# Patient Record
Sex: FEMALE | Race: WHITE | NOT HISPANIC OR LATINO | Employment: OTHER | URBAN - METROPOLITAN AREA
[De-identification: names, ages, dates, MRNs, and addresses within clinical notes are randomized per-mention and may not be internally consistent; named-entity substitution may affect disease eponyms.]

---

## 2023-09-21 PROBLEM — Z96.1 PSEUDOPHAKIA OF RIGHT EYE: Status: ACTIVE | Noted: 2023-09-21

## 2023-09-21 PROBLEM — H40.1133 PRIMARY OPEN-ANGLE GLAUCOMA, BILATERAL, SEVERE STAGE: Status: ACTIVE | Noted: 2023-09-21

## 2023-09-21 RX ORDER — LISINOPRIL 5 MG/1
TABLET ORAL
COMMUNITY

## 2023-09-21 RX ORDER — LATANOPROST 50 UG/ML
1 SOLUTION/ DROPS OPHTHALMIC NIGHTLY
COMMUNITY
End: 2023-10-20 | Stop reason: SDUPTHER

## 2023-09-21 RX ORDER — ASPIRIN 81 MG/1
TABLET ORAL
COMMUNITY
End: 2023-10-20 | Stop reason: WASHOUT

## 2023-09-21 RX ORDER — DORZOLAMIDE HYDROCHLORIDE AND TIMOLOL MALEATE 20; 5 MG/ML; MG/ML
1 SOLUTION/ DROPS OPHTHALMIC 2 TIMES DAILY
COMMUNITY
Start: 2022-11-11 | End: 2023-10-20 | Stop reason: SDUPTHER

## 2023-09-21 RX ORDER — IBUPROFEN 200 MG
TABLET ORAL
COMMUNITY

## 2023-09-21 RX ORDER — ATENOLOL 50 MG/1
TABLET ORAL
COMMUNITY

## 2023-09-21 RX ORDER — LEVOTHYROXINE SODIUM 25 UG/1
TABLET ORAL
COMMUNITY

## 2023-10-20 ENCOUNTER — OFFICE VISIT (OUTPATIENT)
Dept: OPHTHALMOLOGY | Facility: CLINIC | Age: 73
End: 2023-10-20
Payer: MEDICARE

## 2023-10-20 DIAGNOSIS — Z96.1 PSEUDOPHAKIA OF BOTH EYES: ICD-10-CM

## 2023-10-20 DIAGNOSIS — H40.1133 PRIMARY OPEN-ANGLE GLAUCOMA, BILATERAL, SEVERE STAGE: Primary | ICD-10-CM

## 2023-10-20 PROCEDURE — 99213 OFFICE O/P EST LOW 20 MIN: CPT | Performed by: OPHTHALMOLOGY

## 2023-10-20 RX ORDER — ACETAMINOPHEN 500 MG
4000 TABLET ORAL
COMMUNITY
Start: 2021-06-24

## 2023-10-20 RX ORDER — LANOLIN ALCOHOL/MO/W.PET/CERES
1000 CREAM (GRAM) TOPICAL
COMMUNITY
Start: 2020-04-06

## 2023-10-20 RX ORDER — LATANOPROST 50 UG/ML
1 SOLUTION/ DROPS OPHTHALMIC NIGHTLY
Qty: 1.5 ML | Refills: 11 | Status: SHIPPED | OUTPATIENT
Start: 2023-10-20 | End: 2024-10-19

## 2023-10-20 RX ORDER — DORZOLAMIDE HYDROCHLORIDE AND TIMOLOL MALEATE 20; 5 MG/ML; MG/ML
1 SOLUTION/ DROPS OPHTHALMIC 2 TIMES DAILY
Qty: 10 ML | Refills: 11 | Status: SHIPPED | OUTPATIENT
Start: 2023-10-20 | End: 2024-10-19

## 2023-10-20 ASSESSMENT — CONF VISUAL FIELD
OD_NORMAL: 1
OD_SUPERIOR_TEMPORAL_RESTRICTION: 0
OS_INFERIOR_TEMPORAL_RESTRICTION: 0
OD_INFERIOR_TEMPORAL_RESTRICTION: 0
OD_SUPERIOR_NASAL_RESTRICTION: 0
OS_SUPERIOR_NASAL_RESTRICTION: 0
OS_NORMAL: 1
OD_INFERIOR_NASAL_RESTRICTION: 0
OS_INFERIOR_NASAL_RESTRICTION: 0
OS_SUPERIOR_TEMPORAL_RESTRICTION: 0

## 2023-10-20 ASSESSMENT — ENCOUNTER SYMPTOMS
PSYCHIATRIC NEGATIVE: 0
GASTROINTESTINAL NEGATIVE: 0
EYES NEGATIVE: 1
RESPIRATORY NEGATIVE: 0
ALLERGIC/IMMUNOLOGIC NEGATIVE: 0
CARDIOVASCULAR NEGATIVE: 0
HEMATOLOGIC/LYMPHATIC NEGATIVE: 0
ENDOCRINE NEGATIVE: 0
MUSCULOSKELETAL NEGATIVE: 0
CONSTITUTIONAL NEGATIVE: 0
NEUROLOGICAL NEGATIVE: 0

## 2023-10-20 ASSESSMENT — PACHYMETRY
OD_CT(UM): 556
OS_CT(UM): 556

## 2023-10-20 ASSESSMENT — REFRACTION_WEARINGRX
OD_ADD: +2.75
OS_SPHERE: +0.75
OD_SPHERE: +0.25
OS_CYLINDER: -0.50
OS_ADD: +2.75
OD_CYLINDER: DS
OS_AXIS: 117

## 2023-10-20 ASSESSMENT — VISUAL ACUITY
OS_CC: 20/30
CORRECTION_TYPE: GLASSES
METHOD: SNELLEN - LINEAR
OD_CC+: -1
OD_CC: 20/20

## 2023-10-20 ASSESSMENT — CUP TO DISC RATIO
OS_RATIO: 0.9
OD_RATIO: 0.85

## 2023-10-20 ASSESSMENT — TONOMETRY
OS_IOP_MMHG: 14
IOP_METHOD: GOLDMANN APPLANATION
OD_IOP_MMHG: 13

## 2023-10-20 ASSESSMENT — SLIT LAMP EXAM - LIDS
COMMENTS: NORMAL
COMMENTS: NORMAL

## 2023-10-20 ASSESSMENT — EXTERNAL EXAM - RIGHT EYE: OD_EXAM: NORMAL

## 2023-10-20 ASSESSMENT — EXTERNAL EXAM - LEFT EYE: OS_EXAM: NORMAL

## 2023-10-20 NOTE — PROGRESS NOTES
Visual Acuity (Snellen - Linear)         Right Left    Dist cc 20/20 -1 20/30      Correction: Glasses          Tonometry       Tonometry (Goldmann Applanation, 9:35 AM)         Right Left    Pressure 13 14                  Assessment/Plan   Last dilated:  7/14/23    1.  Primary Open-Angle Glaucoma OU:  /555 Tm unknown.  s/p combined with trabectome OD (2/7/18): pre-op VA 20/50, IOP 20 mmHg. s/p combined with trabectome OS (1/2/18):  pre-op VA 20/60, IOP 16 mmHg. IOPs are back within the therapeutic range.    Plan:  cont latanoprost OU QHS           cont dorzolamide/timolol OU BID           f/u 4 months                  2.  Pseudophakia (PCIOL) OU: mild PCO OD s/p YAG Cap OS.  With MRx change VA is 20/20 OU     Plan:  montior    3.  PVD OU:  asymptomatic     Plan:  monitor     4.  H/o high myopia (prior to cataract surgery, pt was ~10D myope OD and ~9D myope OS):       Plan: will monitor for myopic retinal degeneration     5.  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  No change from 2020 photo or clinical exam     Plan:  cont monitoring

## 2024-02-23 ENCOUNTER — APPOINTMENT (OUTPATIENT)
Dept: OPHTHALMOLOGY | Facility: CLINIC | Age: 74
End: 2024-02-23
Payer: MEDICARE

## 2024-05-24 ENCOUNTER — OFFICE VISIT (OUTPATIENT)
Dept: OPHTHALMOLOGY | Facility: CLINIC | Age: 74
End: 2024-05-24
Payer: MEDICARE

## 2024-05-24 DIAGNOSIS — H40.1133 PRIMARY OPEN-ANGLE GLAUCOMA, BILATERAL, SEVERE STAGE: Primary | ICD-10-CM

## 2024-05-24 DIAGNOSIS — Z96.1 PSEUDOPHAKIA OF BOTH EYES: ICD-10-CM

## 2024-05-24 PROCEDURE — 99213 OFFICE O/P EST LOW 20 MIN: CPT | Performed by: OPHTHALMOLOGY

## 2024-05-24 ASSESSMENT — ENCOUNTER SYMPTOMS
PSYCHIATRIC NEGATIVE: 0
RESPIRATORY NEGATIVE: 0
NEUROLOGICAL NEGATIVE: 0
ALLERGIC/IMMUNOLOGIC NEGATIVE: 0
ENDOCRINE NEGATIVE: 0
GASTROINTESTINAL NEGATIVE: 0
CONSTITUTIONAL NEGATIVE: 0
EYES NEGATIVE: 1
CARDIOVASCULAR NEGATIVE: 0
MUSCULOSKELETAL NEGATIVE: 0
HEMATOLOGIC/LYMPHATIC NEGATIVE: 0

## 2024-05-24 ASSESSMENT — EXTERNAL EXAM - RIGHT EYE: OD_EXAM: NORMAL

## 2024-05-24 ASSESSMENT — CONF VISUAL FIELD
OD_INFERIOR_NASAL_RESTRICTION: 0
OD_NORMAL: 1
OS_INFERIOR_NASAL_RESTRICTION: 0
OD_SUPERIOR_NASAL_RESTRICTION: 0
OD_INFERIOR_TEMPORAL_RESTRICTION: 0
OS_NORMAL: 1
OS_INFERIOR_TEMPORAL_RESTRICTION: 0
OS_SUPERIOR_TEMPORAL_RESTRICTION: 0
METHOD: COUNTING FINGERS
OD_SUPERIOR_TEMPORAL_RESTRICTION: 0
OS_SUPERIOR_NASAL_RESTRICTION: 0

## 2024-05-24 ASSESSMENT — REFRACTION_WEARINGRX
OD_ADD: +2.75
OS_ADD: +2.75
OD_SPHERE: +0.25
OS_CYLINDER: -0.50
OS_SPHERE: +0.75
OD_CYLINDER: DS
OS_AXIS: 117

## 2024-05-24 ASSESSMENT — VISUAL ACUITY
OS_CC: 20/30
OS_CC+: -2
OD_CC: 20/20
METHOD: SNELLEN - LINEAR
CORRECTION_TYPE: GLASSES

## 2024-05-24 ASSESSMENT — PACHYMETRY
OD_CT(UM): 556
OS_CT(UM): 556

## 2024-05-24 ASSESSMENT — CUP TO DISC RATIO
OD_RATIO: 0.85
OS_RATIO: 0.9

## 2024-05-24 ASSESSMENT — SLIT LAMP EXAM - LIDS
COMMENTS: NORMAL
COMMENTS: NORMAL

## 2024-05-24 ASSESSMENT — TONOMETRY
IOP_METHOD: GOLDMANN APPLANATION
OS_IOP_MMHG: 16
OD_IOP_MMHG: 16

## 2024-05-24 ASSESSMENT — EXTERNAL EXAM - LEFT EYE: OS_EXAM: NORMAL

## 2024-05-24 NOTE — PROGRESS NOTES
Visual Acuity (Snellen - Linear)         Right Left    Dist cc 20/20 20/30 -2    Dist ph cc  NI      Correction: Glasses          Tonometry       Tonometry (Goldmann Applanation, 10:58 AM)         Right Left    Pressure 16 16                  Assessment/Plan   Last dilated:  7/14/23    1.  Primary Open-Angle Glaucoma OU:  /555 Tm unknown.  s/p combined with trabectome OD (2/7/18): pre-op VA 20/50, IOP 20 mmHg. s/p combined with trabectome OS (1/2/18):  pre-op VA 20/60, IOP 16 mmHg. IOPs are back within the therapeutic range.    Plan:  cont latanoprost OU QHS           cont dorzolamide/timolol OU BID           f/u 2 months (HVF, dilation, RNFL)                  2.  Pseudophakia (PCIOL) OU: mild PCO OD s/p YAG Cap OS.  With MRx change VA is 20/20 OU     Plan:  montior    3.  PVD OU:  asymptomatic     Plan:  monitor     4.  H/o high myopia (prior to cataract surgery, pt was ~10D myope OD and ~9D myope OS):       Plan: will monitor for myopic retinal degeneration     5.  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  No change from 2020 photo or clinical exam     Plan:  cont monitoring

## 2024-08-09 ENCOUNTER — APPOINTMENT (OUTPATIENT)
Dept: OPHTHALMOLOGY | Facility: CLINIC | Age: 74
End: 2024-08-09
Payer: MEDICARE

## 2024-08-30 ENCOUNTER — APPOINTMENT (OUTPATIENT)
Dept: OPHTHALMOLOGY | Facility: CLINIC | Age: 74
End: 2024-08-30
Payer: MEDICARE

## 2024-08-30 DIAGNOSIS — Z96.1 PSEUDOPHAKIA OF BOTH EYES: ICD-10-CM

## 2024-08-30 DIAGNOSIS — H40.1133 PRIMARY OPEN-ANGLE GLAUCOMA, BILATERAL, SEVERE STAGE: Primary | ICD-10-CM

## 2024-08-30 DIAGNOSIS — D31.31 CHOROIDAL NEVUS, RIGHT EYE: ICD-10-CM

## 2024-08-30 PROCEDURE — 92083 EXTENDED VISUAL FIELD XM: CPT | Performed by: OPHTHALMOLOGY

## 2024-08-30 PROCEDURE — 99214 OFFICE O/P EST MOD 30 MIN: CPT | Performed by: OPHTHALMOLOGY

## 2024-08-30 PROCEDURE — 92133 CPTRZD OPH DX IMG PST SGM ON: CPT | Performed by: OPHTHALMOLOGY

## 2024-08-30 ASSESSMENT — PACHYMETRY
OD_CT(UM): 556
OS_CT(UM): 556

## 2024-08-30 ASSESSMENT — REFRACTION_WEARINGRX
OS_SPHERE: +0.75
OD_SPHERE: +0.25
OS_ADD: +2.75
OD_CYLINDER: DS
OD_ADD: +2.75
OS_AXIS: 117
OS_CYLINDER: -0.50

## 2024-08-30 ASSESSMENT — SLIT LAMP EXAM - LIDS
COMMENTS: NORMAL
COMMENTS: NORMAL

## 2024-08-30 ASSESSMENT — VISUAL ACUITY
OS_CC+: +2
OD_CC+: -1
OS_CC: 20/25
OD_CC: 20/20
METHOD: SNELLEN - LINEAR

## 2024-08-30 ASSESSMENT — TONOMETRY
IOP_METHOD: GOLDMANN APPLANATION
OS_IOP_MMHG: 16
OD_IOP_MMHG: 16

## 2024-08-30 ASSESSMENT — CUP TO DISC RATIO
OS_RATIO: 0.9
OD_RATIO: 0.85

## 2024-08-30 ASSESSMENT — EXTERNAL EXAM - RIGHT EYE: OD_EXAM: NORMAL

## 2024-08-30 ASSESSMENT — EXTERNAL EXAM - LEFT EYE: OS_EXAM: NORMAL

## 2024-08-30 NOTE — PROGRESS NOTES
Visual Acuity (Snellen - Linear)         Right Left    Dist cc 20/20 -1 20/25 +2          Tonometry       Tonometry (Goldmann Applanation, 8:20 AM)         Right Left    Pressure 16 16                      Assessment/Plan   Last dilated:  8/30/24    1.  Primary Open-Angle Glaucoma OU:  /555 Tm unknown.  s/p combined with trabectome OD (2/7/18): pre-op VA 20/50, IOP 20 mmHg. s/p combined with trabectome OS (1/2/18):  pre-op VA 20/60, IOP 16 mmHg. IOPs are back within the therapeutic range.    Plan:  cont latanoprost OU QHS           cont dorzolamide/timolol OU BID           f/u 4 months                   2.  Pseudophakia (PCIOL) OU: mild PCO OD s/p YAG Cap OS.  With MRx change VA is 20/20 OU     Plan:  montior    3.  PVD OU:  asymptomatic     Plan:  monitor     4.  H/o high myopia (prior to cataract surgery, pt was ~10D myope OD and ~9D myope OS):       Plan: will monitor for myopic retinal degeneration     5.  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  No change from 2020 photo or clinical exam     Plan:  consult Dr. eMndoza 1-2 months for monitoring

## 2024-09-15 DIAGNOSIS — H40.1133 PRIMARY OPEN-ANGLE GLAUCOMA, BILATERAL, SEVERE STAGE: ICD-10-CM

## 2024-09-15 RX ORDER — LATANOPROST 50 UG/ML
1 SOLUTION/ DROPS OPHTHALMIC NIGHTLY
Qty: 7.5 ML | Refills: 3 | Status: SHIPPED | OUTPATIENT
Start: 2024-09-15 | End: 2025-09-15

## 2024-09-15 RX ORDER — DORZOLAMIDE HYDROCHLORIDE AND TIMOLOL MALEATE 20; 5 MG/ML; MG/ML
1 SOLUTION/ DROPS OPHTHALMIC 2 TIMES DAILY
Qty: 10 ML | Refills: 4 | Status: SHIPPED | OUTPATIENT
Start: 2024-09-15 | End: 2025-09-15

## 2024-09-16 NOTE — TELEPHONE ENCOUNTER
Pt called refill line requesting refill on Latanoprost and Cosopt.     LOV w/ Dr. Miller on 08/30/2024

## 2024-10-11 ENCOUNTER — APPOINTMENT (OUTPATIENT)
Dept: OPHTHALMOLOGY | Facility: CLINIC | Age: 74
End: 2024-10-11
Payer: MEDICARE

## 2024-10-11 DIAGNOSIS — D31.31 CHOROIDAL NEVUS, RIGHT EYE: Primary | ICD-10-CM

## 2024-10-11 ASSESSMENT — VISUAL ACUITY
OD_CC: 20/20
OS_CC: 20/25
METHOD: SNELLEN - LINEAR

## 2024-10-11 ASSESSMENT — EXTERNAL EXAM - LEFT EYE: OS_EXAM: NORMAL

## 2024-10-11 ASSESSMENT — CUP TO DISC RATIO
OD_RATIO: 0.85
OS_RATIO: 0.9

## 2024-10-11 ASSESSMENT — PACHYMETRY
OD_CT(UM): 556
OS_CT(UM): 556

## 2024-10-11 ASSESSMENT — ENCOUNTER SYMPTOMS
MUSCULOSKELETAL NEGATIVE: 0
GASTROINTESTINAL NEGATIVE: 0
EYES NEGATIVE: 1
HEMATOLOGIC/LYMPHATIC NEGATIVE: 0
PSYCHIATRIC NEGATIVE: 0
RESPIRATORY NEGATIVE: 0
CARDIOVASCULAR NEGATIVE: 0
NEUROLOGICAL NEGATIVE: 0
CONSTITUTIONAL NEGATIVE: 0
ENDOCRINE NEGATIVE: 0
ALLERGIC/IMMUNOLOGIC NEGATIVE: 0

## 2024-10-11 ASSESSMENT — SLIT LAMP EXAM - LIDS
COMMENTS: NORMAL
COMMENTS: NORMAL

## 2024-10-11 ASSESSMENT — TONOMETRY
OD_IOP_MMHG: 16
IOP_METHOD: GOLDMANN APPLANATION
OS_IOP_MMHG: 17

## 2024-10-11 ASSESSMENT — EXTERNAL EXAM - RIGHT EYE: OD_EXAM: NORMAL

## 2024-10-11 NOTE — PROGRESS NOTES
Choroidal nevus of right eyeD31.31  - Diagnosed in 2020  - DFE/color photo: Flat pigmented choroidal lesion with halo in the inferior to IT ARC                   (-) SRF                   (-) Orange pigment                   (-) drusen    OCT 10/11/24    1. Right eye (OD): Myopic contour, normal RPE, intact outer and inner retinal layers. No IRF or SRF, focal area of RPE irregularities SN to fovea   2. Left eye (OS): Myopic contour, ERM, normal RPE, intact outer and inner retinal layers. No IRF or SRF    -OCT scan through the lesion: Choroidal lesion with compression of the CC with no overlying retinal abnormality    #Plan#:  - Observe  - RTC in 12 m for DFE, Color photos,     PVD OU:  asymptomatic     Plan:  monitor     H/o high myopia (prior to cataract surgery, pt was ~10D myope OD and ~9D myope OS):       Plan: will monitor for myopic retinal degeneration     Primary Open-Angle Glaucoma OU:    Plan:  as per Dr. Miller  cont latanoprost OU QHS  cont dorzolamide/timolol OU BID

## 2024-10-11 NOTE — PROGRESS NOTES
Visual Acuity (Snellen - Linear)         Right Left    Dist cc 20/20 20/25          Tonometry       Tonometry (Goldmann Applanation, 9:28 AM)         Right Left    Pressure 16 17                      Assessment/Plan   Last dilated:  8/30/24    1.  Primary Open-Angle Glaucoma OU:  /555 Tm unknown.  s/p combined with trabectome OD (2/7/18): pre-op VA 20/50, IOP 20 mmHg. s/p combined with trabectome OS (1/2/18):  pre-op VA 20/60, IOP 16 mmHg. IOPs are back within the therapeutic range.    Plan:  cont latanoprost OU QHS           cont dorzolamide/timolol OU BID           f/u 4 months                   2.  Pseudophakia (PCIOL) OU: mild PCO OD s/p YAG Cap OS.  With MRx change VA is 20/20 OU     Plan:  montior    3.  PVD OU:  asymptomatic     Plan:  monitor     4.  H/o high myopia (prior to cataract surgery, pt was ~10D myope OD and ~9D myope OS):       Plan: will monitor for myopic retinal degeneration     5.  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  No change from 2020 photo or clinical exam     Plan:  consult Dr. Mendoza 1-2 months for monitoring

## 2024-12-20 ENCOUNTER — APPOINTMENT (OUTPATIENT)
Dept: OPHTHALMOLOGY | Facility: CLINIC | Age: 74
End: 2024-12-20
Payer: MEDICARE

## 2025-01-31 ENCOUNTER — APPOINTMENT (OUTPATIENT)
Dept: OPHTHALMOLOGY | Facility: CLINIC | Age: 75
End: 2025-01-31
Payer: MEDICARE

## 2025-05-02 ENCOUNTER — APPOINTMENT (OUTPATIENT)
Dept: OPHTHALMOLOGY | Facility: CLINIC | Age: 75
End: 2025-05-02
Payer: COMMERCIAL

## 2025-05-02 DIAGNOSIS — Z96.1 PSEUDOPHAKIA OF BOTH EYES: ICD-10-CM

## 2025-05-02 DIAGNOSIS — H40.1133 PRIMARY OPEN-ANGLE GLAUCOMA, BILATERAL, SEVERE STAGE: ICD-10-CM

## 2025-05-02 DIAGNOSIS — D31.31 CHOROIDAL NEVUS, RIGHT EYE: Primary | ICD-10-CM

## 2025-05-02 PROCEDURE — 99213 OFFICE O/P EST LOW 20 MIN: CPT | Performed by: OPHTHALMOLOGY

## 2025-05-02 RX ORDER — LATANOPROST 50 UG/ML
1 SOLUTION/ DROPS OPHTHALMIC NIGHTLY
Qty: 7.5 ML | Refills: 3 | Status: SHIPPED | OUTPATIENT
Start: 2025-05-02 | End: 2026-05-02

## 2025-05-02 RX ORDER — DORZOLAMIDE HYDROCHLORIDE AND TIMOLOL MALEATE 20; 5 MG/ML; MG/ML
1 SOLUTION/ DROPS OPHTHALMIC 2 TIMES DAILY
Qty: 30 ML | Refills: 3 | Status: SHIPPED | OUTPATIENT
Start: 2025-05-02 | End: 2026-05-02

## 2025-05-02 ASSESSMENT — TONOMETRY
IOP_METHOD: GOLDMANN APPLANATION
OD_IOP_MMHG: 16
OS_IOP_MMHG: 17

## 2025-05-02 ASSESSMENT — REFRACTION_WEARINGRX
OS_CYLINDER: -0.50
OS_AXIS: 117
OD_SPHERE: +0.25
OD_CYLINDER: DS
OD_ADD: +2.75
OS_SPHERE: +0.75
OS_ADD: +2.75

## 2025-05-02 ASSESSMENT — ENCOUNTER SYMPTOMS
ALLERGIC/IMMUNOLOGIC NEGATIVE: 0
HEMATOLOGIC/LYMPHATIC NEGATIVE: 0
RESPIRATORY NEGATIVE: 0
PSYCHIATRIC NEGATIVE: 0
NEUROLOGICAL NEGATIVE: 0
EYES NEGATIVE: 1
ENDOCRINE NEGATIVE: 0
MUSCULOSKELETAL NEGATIVE: 0
CARDIOVASCULAR NEGATIVE: 0
GASTROINTESTINAL NEGATIVE: 0
CONSTITUTIONAL NEGATIVE: 0

## 2025-05-02 ASSESSMENT — VISUAL ACUITY
OS_CC: 20/40
CORRECTION_TYPE: GLASSES
METHOD: SNELLEN - LINEAR
OD_CC: 20/30-2

## 2025-05-02 ASSESSMENT — CONF VISUAL FIELD
METHOD: COUNTING FINGERS
OS_SUPERIOR_TEMPORAL_RESTRICTION: 0
OD_SUPERIOR_TEMPORAL_RESTRICTION: 0
OD_INFERIOR_NASAL_RESTRICTION: 0
OD_NORMAL: 1
OS_INFERIOR_TEMPORAL_RESTRICTION: 0
OD_INFERIOR_TEMPORAL_RESTRICTION: 0
OD_SUPERIOR_NASAL_RESTRICTION: 0
OS_NORMAL: 1
OS_INFERIOR_NASAL_RESTRICTION: 0
OS_SUPERIOR_NASAL_RESTRICTION: 0

## 2025-05-02 ASSESSMENT — PACHYMETRY
OD_CT(UM): 556
OS_CT(UM): 556

## 2025-05-02 ASSESSMENT — CUP TO DISC RATIO
OD_RATIO: 0.85
OS_RATIO: 0.9

## 2025-05-02 ASSESSMENT — SLIT LAMP EXAM - LIDS
COMMENTS: NORMAL
COMMENTS: NORMAL

## 2025-05-02 ASSESSMENT — EXTERNAL EXAM - RIGHT EYE: OD_EXAM: NORMAL

## 2025-05-02 ASSESSMENT — EXTERNAL EXAM - LEFT EYE: OS_EXAM: NORMAL

## 2025-05-02 NOTE — PROGRESS NOTES
Visual Acuity (Snellen - Linear)         Right Left    Dist cc 20/30-2 20/40      Correction: Glasses          Tonometry       Tonometry (Goldmann Applanation, 11:24 AM)         Right Left    Pressure 16 17                  Assessment/Plan   Last dilated:  8/30/24    1.  Primary Open-Angle Glaucoma OU:  /555 Tm unknown.  s/p combined with trabectome OD (2/7/18): pre-op VA 20/50, IOP 20 mmHg. s/p combined with trabectome OS (1/2/18):  pre-op VA 20/60, IOP 16 mmHg. IOPs are back within the therapeutic range.    Plan:  cont latanoprost OU QHS           cont dorzolamide/timolol OU BID           f/u 4 months  (HVF, dilation, RNFL)                 2.  Pseudophakia (PCIOL) OU: mild PCO OD s/p YAG Cap OS.  With MRx change VA is 20/20 OU     Plan:  montior    3.  PVD OU:  asymptomatic     Plan:  monitor     4.  H/o high myopia (prior to cataract surgery, pt was ~10D myope OD and ~9D myope OS):       Plan: will monitor for myopic retinal degeneration     5.  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  No change from 2020 photo or clinical exam     Plan:  as per Dr. Mendoza

## 2025-09-12 ENCOUNTER — APPOINTMENT (OUTPATIENT)
Dept: OPHTHALMOLOGY | Facility: CLINIC | Age: 75
End: 2025-09-12
Payer: MEDICARE

## 2025-10-06 ENCOUNTER — APPOINTMENT (OUTPATIENT)
Dept: OPHTHALMOLOGY | Facility: CLINIC | Age: 75
End: 2025-10-06
Payer: MEDICARE

## 2025-10-10 ENCOUNTER — APPOINTMENT (OUTPATIENT)
Dept: OPHTHALMOLOGY | Facility: CLINIC | Age: 75
End: 2025-10-10
Payer: MEDICARE

## 2025-10-23 ENCOUNTER — APPOINTMENT (OUTPATIENT)
Dept: OPHTHALMOLOGY | Facility: CLINIC | Age: 75
End: 2025-10-23
Payer: MEDICARE